# Patient Record
Sex: FEMALE | NOT HISPANIC OR LATINO | URBAN - METROPOLITAN AREA
[De-identification: names, ages, dates, MRNs, and addresses within clinical notes are randomized per-mention and may not be internally consistent; named-entity substitution may affect disease eponyms.]

---

## 2024-02-26 ENCOUNTER — OFFICE VISIT (OUTPATIENT)
Dept: DERMATOLOGY | Facility: CLINIC | Age: 44
End: 2024-02-26
Payer: COMMERCIAL

## 2024-02-26 DIAGNOSIS — D22.9 MULTIPLE BENIGN NEVI: ICD-10-CM

## 2024-02-26 DIAGNOSIS — L57.8 PHOTOAGING OF SKIN: Primary | ICD-10-CM

## 2024-02-26 DIAGNOSIS — L73.9 INFLAMED HAIR FOLLICLE: ICD-10-CM

## 2024-02-26 PROCEDURE — 99213 OFFICE O/P EST LOW 20 MIN: CPT | Performed by: STUDENT IN AN ORGANIZED HEALTH CARE EDUCATION/TRAINING PROGRAM

## 2024-02-26 RX ORDER — CABERGOLINE 0.5 MG/1
0.25 TABLET ORAL 2 TIMES WEEKLY
COMMUNITY

## 2024-02-26 RX ORDER — TRETINOIN 0.25 MG/G
CREAM TOPICAL NIGHTLY
Qty: 45 G | Refills: 3 | Status: SHIPPED | OUTPATIENT
Start: 2024-02-26 | End: 2025-02-25

## 2024-02-26 NOTE — PROGRESS NOTES
Subjective     Maryam Obrien is a 43 y.o. female who presents for the following: Skin Check (FBSE, area of concern bump by left ear and possible cyst under right armpit).     Review of Systems:  No other skin or systemic complaints other than what is documented elsewhere in the note.    The following portions of the chart were reviewed this encounter and updated as appropriate:          Skin Cancer History  No skin cancer on file.      Specialty Problems    None       Objective   Well appearing patient in no apparent distress; mood and affect are within normal limits.    A focused skin examination was performed. All findings within normal limits unless otherwise noted below.    Assessment/Plan   1. Photoaging of skin  Head - Anterior (Face)    Rx tretinoin    Related Medications  tretinoin (Retin-A) 0.025 % cream  Apply topically once daily at bedtime. A pea-sized amount to the whole face, start every 2-3 nights and gradually increase to nightly    2. Multiple benign nevi    Exam findings: Benign appearing macules and papules  Plan: monitor for any new or changing nevi. Notify me should this occur.  Over the counter use of sun screen product (30+ SPF with mineral sun screen) recommended      3. Inflamed hair follicle  Right Axilla  I favor follicular rupture  Onexamthere dwayne mobile soft reddish subcutaneous papule on the right axilla  If its not improving by end of April I will orderultrasound just message me to let me know and if ultrasound shows peristent cyst ill just inject with steroid

## 2024-03-04 ENCOUNTER — APPOINTMENT (OUTPATIENT)
Dept: DERMATOLOGY | Facility: CLINIC | Age: 44
End: 2024-03-04
Payer: COMMERCIAL

## 2025-01-15 ENCOUNTER — OFFICE VISIT (OUTPATIENT)
Dept: GASTROENTEROLOGY | Facility: CLINIC | Age: 45
End: 2025-01-15
Payer: COMMERCIAL

## 2025-01-15 VITALS
HEART RATE: 71 BPM | DIASTOLIC BLOOD PRESSURE: 71 MMHG | SYSTOLIC BLOOD PRESSURE: 108 MMHG | WEIGHT: 142 LBS | TEMPERATURE: 97.7 F

## 2025-01-15 ASSESSMENT — PAIN SCALES - GENERAL: PAINLEVEL_OUTOF10: 0-NO PAIN

## 2025-02-28 ASSESSMENT — DERMATOLOGY QUALITY OF LIFE (QOL) ASSESSMENT
RATE HOW EMOTIONALLY BOTHERED YOU ARE BY YOUR SKIN PROBLEM (FOR EXAMPLE, WORRY, EMBARRASSMENT, FRUSTRATION): 0 - NEVER BOTHERED
DATE THE QUALITY-OF-LIFE ASSESSMENT WAS COMPLETED: 67267
ARE THERE EXCLUSIONS OR EXCEPTIONS FOR THE QUALITY OF LIFE ASSESSMENT: NO
RATE HOW BOTHERED YOU ARE BY SYMPTOMS OF YOUR SKIN PROBLEM (EG, ITCHING, STINGING BURNING, HURTING OR SKIN IRRITATION): 0 - NEVER BOTHERED
RATE HOW BOTHERED YOU ARE BY SYMPTOMS OF YOUR SKIN PROBLEM (EG, ITCHING, STINGING BURNING, HURTING OR SKIN IRRITATION): 0 - NEVER BOTHERED
WHAT SINGLE SKIN CONDITION LISTED BELOW IS THE PATIENT ANSWERING THE QUALITY-OF-LIFE ASSESSMENT QUESTIONS ABOUT: NONE OF THE ABOVE
RATE HOW EMOTIONALLY BOTHERED YOU ARE BY YOUR SKIN PROBLEM (FOR EXAMPLE, WORRY, EMBARRASSMENT, FRUSTRATION): 0 - NEVER BOTHERED
WHAT SINGLE SKIN CONDITION LISTED BELOW IS THE PATIENT ANSWERING THE QUALITY-OF-LIFE ASSESSMENT QUESTIONS ABOUT: NONE OF THE ABOVE
RATE HOW BOTHERED YOU ARE BY EFFECTS OF YOUR SKIN PROBLEMS ON YOUR ACTIVITIES (EG, GOING OUT, ACCOMPLISHING WHAT YOU WANT, WORK ACTIVITIES OR YOUR RELATIONSHIPS WITH OTHERS): 0 - NEVER BOTHERED
RATE HOW BOTHERED YOU ARE BY EFFECTS OF YOUR SKIN PROBLEMS ON YOUR ACTIVITIES (EG, GOING OUT, ACCOMPLISHING WHAT YOU WANT, WORK ACTIVITIES OR YOUR RELATIONSHIPS WITH OTHERS): 0 - NEVER BOTHERED

## 2025-02-28 ASSESSMENT — PATIENT GLOBAL ASSESSMENT (PGA): WHAT IS THE PGA: PATIENT GLOBAL ASSESSMENT:  1 - CLEAR

## 2025-03-03 ENCOUNTER — APPOINTMENT (OUTPATIENT)
Dept: DERMATOLOGY | Facility: CLINIC | Age: 45
End: 2025-03-03
Payer: COMMERCIAL

## 2025-03-03 DIAGNOSIS — L70.0 ACNE VULGARIS: Primary | ICD-10-CM

## 2025-03-03 DIAGNOSIS — D22.9 MULTIPLE BENIGN NEVI: ICD-10-CM

## 2025-03-03 DIAGNOSIS — L82.1 SEBORRHEIC KERATOSIS: ICD-10-CM

## 2025-03-03 DIAGNOSIS — L57.8 PHOTOAGING OF SKIN: ICD-10-CM

## 2025-03-03 PROCEDURE — 99213 OFFICE O/P EST LOW 20 MIN: CPT | Performed by: STUDENT IN AN ORGANIZED HEALTH CARE EDUCATION/TRAINING PROGRAM

## 2025-03-03 RX ORDER — TRETINOIN 0.5 MG/G
CREAM TOPICAL
Qty: 160 G | Refills: 11 | Status: SHIPPED | OUTPATIENT
Start: 2025-03-03

## 2025-03-03 NOTE — PROGRESS NOTES
Subjective     Maryam Obrien is a 44 y.o. female who presents for the following: Skin Check (FBSE, area of concern on abdomen. no areas of concern.No Hx of skin cancer) and Med Refill (tretinoin).     Review of Systems:  No other skin or systemic complaints other than what is documented elsewhere in the note.    The following portions of the chart were reviewed this encounter and updated as appropriate:         Skin Cancer History  No skin cancer on file.      Specialty Problems    None       Objective   Well appearing patient in no apparent distress; mood and affect are within normal limits.    A full examination was performed including scalp, head, eyes, ears, nose, lips, neck, chest, axillae, abdomen, back, buttocks, bilateral upper extremities, bilateral lower extremities, hands, feet, fingers, toes, fingernails, and toenails. All findings within normal limits unless otherwise noted below.    Assessment/Plan   1. Acne vulgaris    Related Medications  tretinoin (Retin-A) 0.05 % cream  Use nightly on face as tolerated    2. Multiple benign nevi    Exam findings: Benign appearing macules and papules  Plan: monitor for any new or changing nevi. Notify me should this occur.  Over the counter use of sun screen product (30+ SPF with mineral sun screen) recommended      3. Photoaging of skin  Head - Anterior (Face)    Rx tretinoin    Related Medications  tretinoin (Retin-A) 0.025 % cream  Apply topically once daily at bedtime. A pea-sized amount to the whole face, start every 2-3 nights and gradually increase to nightly    4. Seborrheic keratosis (2)  Left Abdomen (side) - Upper; Generalized    Benign appearing seborrheic papules on trunk/extremities  Reassured, benign

## 2025-03-05 ENCOUNTER — TELEPHONE (OUTPATIENT)
Dept: DERMATOLOGY | Facility: CLINIC | Age: 45
End: 2025-03-05
Payer: COMMERCIAL

## 2025-03-05 NOTE — TELEPHONE ENCOUNTER
Pharmacy called asking for clarification of gram amount for Tretinoin cream. The RX said 160 gram. I called back and clarified to give a 45 gram tube for 30 day supply. Pharmacy only had 20 gram tubes in stock and stated they would give patient 2 of the tubes to equal 40 grams.

## 2025-04-02 ENCOUNTER — OFFICE VISIT (OUTPATIENT)
Dept: GASTROENTEROLOGY | Facility: HOSPITAL | Age: 45
End: 2025-04-02
Payer: COMMERCIAL

## 2025-04-02 VITALS
TEMPERATURE: 98.1 F | SYSTOLIC BLOOD PRESSURE: 112 MMHG | OXYGEN SATURATION: 96 % | DIASTOLIC BLOOD PRESSURE: 79 MMHG | WEIGHT: 140.7 LBS | HEART RATE: 97 BPM

## 2025-04-02 DIAGNOSIS — K51.811 OTHER ULCERATIVE COLITIS WITH RECTAL BLEEDING: Primary | ICD-10-CM

## 2025-04-02 DIAGNOSIS — R19.7 DIARRHEA, UNSPECIFIED TYPE: ICD-10-CM

## 2025-04-02 PROCEDURE — 99214 OFFICE O/P EST MOD 30 MIN: CPT | Mod: GC | Performed by: INTERNAL MEDICINE

## 2025-04-02 PROCEDURE — 99204 OFFICE O/P NEW MOD 45 MIN: CPT | Performed by: INTERNAL MEDICINE

## 2025-04-02 RX ORDER — ERGOCALCIFEROL 1.25 MG/1
1 CAPSULE ORAL
COMMUNITY
Start: 2024-06-19

## 2025-04-02 RX ORDER — ESCITALOPRAM OXALATE 5 MG/1
5 TABLET ORAL DAILY
COMMUNITY

## 2025-04-02 SDOH — ECONOMIC STABILITY: FOOD INSECURITY: WITHIN THE PAST 12 MONTHS, THE FOOD YOU BOUGHT JUST DIDN'T LAST AND YOU DIDN'T HAVE MONEY TO GET MORE.: NEVER TRUE

## 2025-04-02 SDOH — ECONOMIC STABILITY: FOOD INSECURITY: WITHIN THE PAST 12 MONTHS, YOU WORRIED THAT YOUR FOOD WOULD RUN OUT BEFORE YOU GOT MONEY TO BUY MORE.: NEVER TRUE

## 2025-04-02 ASSESSMENT — ENCOUNTER SYMPTOMS
WEAKNESS: 0
DYSPHORIC MOOD: 0
BACK PAIN: 0
CHILLS: 0
TROUBLE SWALLOWING: 0
DYSURIA: 0
FEVER: 0
FATIGUE: 0
HEADACHES: 0
ARTHRALGIAS: 0
MYALGIAS: 0
LIGHT-HEADEDNESS: 0
ROS GI COMMENTS: SEE HPI
DIAPHORESIS: 0
COUGH: 0
SHORTNESS OF BREATH: 0

## 2025-04-02 ASSESSMENT — LIFESTYLE VARIABLES
HOW OFTEN DO YOU HAVE SIX OR MORE DRINKS ON ONE OCCASION: NEVER
SKIP TO QUESTIONS 9-10: 1
HOW MANY STANDARD DRINKS CONTAINING ALCOHOL DO YOU HAVE ON A TYPICAL DAY: 1 OR 2
HOW OFTEN DO YOU HAVE A DRINK CONTAINING ALCOHOL: MONTHLY OR LESS
AUDIT-C TOTAL SCORE: 1

## 2025-04-02 ASSESSMENT — PATIENT HEALTH QUESTIONNAIRE - PHQ9
SUM OF ALL RESPONSES TO PHQ9 QUESTIONS 1 & 2: 2
2. FEELING DOWN, DEPRESSED OR HOPELESS: SEVERAL DAYS
10. IF YOU CHECKED OFF ANY PROBLEMS, HOW DIFFICULT HAVE THESE PROBLEMS MADE IT FOR YOU TO DO YOUR WORK, TAKE CARE OF THINGS AT HOME, OR GET ALONG WITH OTHER PEOPLE: SOMEWHAT DIFFICULT
1. LITTLE INTEREST OR PLEASURE IN DOING THINGS: SEVERAL DAYS

## 2025-04-02 ASSESSMENT — PAIN SCALES - GENERAL: PAINLEVEL_OUTOF10: 0-NO PAIN

## 2025-04-02 NOTE — PROGRESS NOTES
"Subjective     History of Present Illness:   Maryam Obrien is a 44 y.o. female with PMH of prolactinoma, anxiety, iron deficiency anemia, and ulcerative colitis who presented to GI clinic for second opinion. She has previously followed Arnold Jackman and Rudy at [facility].     Patient developed intermittent rectal bleeding and abdominal pain in 2019. Colonoscopy was performed at [facility] on 3/9/20 (report not available) with biopsies demonstrating chronic active colitis in the distal 20 cm consistent with ulcerative proctitis. She tried eliminating meat from her diet which did not help. She developed worsening GI symptoms and underwent sigmoidoscopy on 3/29/21 that showed diffuse colitis from rectum to splenic flexure (extent of exam). She was treated with mesalamine. However, she continues to require intermittent courses of prednisone for symptom control throughout 2022. Repeat colonoscopy in 1/2023 showed Albarran 2 colitis involving the rectum extending to the transverse colon.     Patient was started on vedolizumab in April 2023. She was on vedolizumab for 4-5 months but did not experience any improvement. She stopped all medications and saw a functional medicine specialist. She did a \"parasite cleanse\" and took supplements (B12, probiotics).  She was admitted to [facility] for UC flare in 9/2024.  CT abd/pelvis 9/25/24 showed diffuse colitis. FC was 3640. She was discharged with prednisone taper.      Patient last saw Dr. Grant in November 2024 and discussed advanced therapy.  She also completed 6 iron transfusions.    Currently, she averages 5-6 loose Bms a day.  She reports having abdominal cramping and RLQ pain, relieved with defecation.  She has occasional hematochezia which she attributes to her hemorrhoids.  Denies EIMs.  She has had a 5 lb intentional weight loss after eating a healthier diet and avoiding processed foods. She is not currently taking any medications for UC and is off prednisone.     Albarran Score:  Stool " frequency: 2 (2: 3-4 stools/day)  Rectal bleedin (1: blood <½ time)  Physician ratin (2: moderate)     Partial Score: 5 (5-7: moderate)     Review of Systems  Review of Systems   Constitutional:  Negative for chills, diaphoresis, fatigue and fever.   HENT:  Negative for trouble swallowing.    Respiratory:  Negative for cough and shortness of breath.    Cardiovascular:  Negative for chest pain.   Gastrointestinal:         See HPI   Genitourinary:  Negative for dysuria.   Musculoskeletal:  Negative for arthralgias, back pain and myalgias.   Skin:  Negative for rash.   Allergic/Immunologic: Negative for immunocompromised state.   Neurological:  Negative for weakness, light-headedness and headaches.   Psychiatric/Behavioral:  Negative for dysphoric mood.    All other systems reviewed and are negative.      Past Medical History   has a past medical history of Ulcerative colitis.     Social History   reports that she has never smoked. She has never used smokeless tobacco. She reports that she does not currently use alcohol. She reports that she does not use drugs.     Family History  family history is not on file.       Allergies  Allergies   Allergen Reactions    Animal Dander Other     Sneezing, eye irritation.    Pollen Extracts Itching       Medications  Current Outpatient Medications   Medication Instructions    cabergoline (DOSTINEX) 0.25 mg, 2 times weekly    ergocalciferol (Vitamin D-2) 1250 mcg (50,000 units) capsule 1 capsule, Every 7 days    escitalopram (LEXAPRO) 5 mg, Daily    tretinoin (Retin-A) 0.05 % cream Use nightly on face as tolerated        Objective   Visit Vitals  /79   Pulse 97   Temp 36.7 °C (98.1 °F)      Vitals:    25 0936   Weight: 63.8 kg (140 lb 11.2 oz)     There is no height or weight on file to calculate BMI.  Physical Exam  Vitals reviewed.   Constitutional:       General: She is not in acute distress.     Appearance: Normal appearance. She is not ill-appearing or  "toxic-appearing.   HENT:      Head: Normocephalic and atraumatic.      Nose: Nose normal.      Mouth/Throat:      Mouth: Mucous membranes are moist.      Pharynx: Oropharynx is clear.   Eyes:      General: No scleral icterus.     Conjunctiva/sclera: Conjunctivae normal.      Pupils: Pupils are equal, round, and reactive to light.   Cardiovascular:      Rate and Rhythm: Normal rate and regular rhythm.      Pulses: Normal pulses.   Pulmonary:      Effort: Pulmonary effort is normal. No respiratory distress.   Abdominal:      General: Abdomen is flat. There is no distension.      Palpations: Abdomen is soft. There is no mass.      Tenderness: There is no abdominal tenderness. There is no guarding or rebound.   Skin:     General: Skin is warm and dry.      Capillary Refill: Capillary refill takes less than 2 seconds.      Coloration: Skin is not jaundiced.   Neurological:      Mental Status: She is alert and oriented to person, place, and time.         Labs  No results found for: \"WBC\", \"HGB\", \"HCT\", \"MCV\", \"PLT\"  No results found for: \"GLUCOSE\", \"CALCIUM\", \"NA\", \"K\", \"CO2\", \"CL\", \"BUN\", \"CREATININE\"  No results found for: \"ALT\", \"AST\", \"GGT\", \"ALKPHOS\", \"BILITOT\"  No results found for: \"CRP\"  No results found for: \"CALPS\"    Recent labs from [facility] were reviewed.    Assessment   Maryam Obrien is a 44 y.o. female with PMH of prolactinoma, anxiety, iron deficiency anemia, and ulcerative colitis who presented to GI clinic for second opinion.  Patient has extensive ulcerative colitis with moderate symptoms.  She has failed 5-ASAs and was a primary non-responder to vedolizumab.  I recommend escalation to advanced therapy.  We reviewed over the benefits and risks of different types of advanced therapies.  I think anti-IL23s or S1P modulators would be appropriate option for her.  She prefers the S1P Velsipity since it is an oral option.  We also discussed about natural supplements given her interest in alternative therapy.  She " can consider indigo naturalis which has some evidence in treating UC and we reviewed over the dosage as well reported side effects.  She will let us know if she wishes to continue follow in our clinic.      Plan  We discussed the natural course of ulcerative colitis and the benefits and risks of therapies.  I recommend that patient starts advanced therapy.  We discussed about IL-23/12 inhibitor (Skyrizi, Tremfya, Stelara) as well as S1P (Velsipity).  Patient prefers Velsipity (oral option).    She can contact Dr. Grant or our office if she wishes to be started on Velsipity.  Patient is also interested in evaluation for celiac disease.  I recommend checking tissue transglutaminase level.  Patient will benefit from repeating EGD and colonoscopy in the near future.    Norman Alas MD, Fellow    Jesse Ayala MD

## 2025-04-02 NOTE — PATIENT INSTRUCTIONS
We discussed the natural course of ulcerative colitis and the benefits and risks of therapies.  I recommend that patient starts advanced therapy.  We discussed about IL-23/12 inhibitor (Skyrizi, Tremfya, Stelara) as well as S1P (Velsipity).  Patient prefers Velsipity (oral option).    She can contact Dr. Grant or our office if she wishes to be started on Velsipity.  Patient is also interested in evaluation for celiac disease.  I recommend checking tissue transglutaminase level.  Patient will benefit from repeating EGD and colonoscopy in the near future.

## 2025-04-04 LAB — TTG IGA SER-ACNC: <1 U/ML
